# Patient Record
Sex: FEMALE | URBAN - METROPOLITAN AREA
[De-identification: names, ages, dates, MRNs, and addresses within clinical notes are randomized per-mention and may not be internally consistent; named-entity substitution may affect disease eponyms.]

---

## 2023-08-15 ENCOUNTER — WEB ENCOUNTER (OUTPATIENT)
Dept: URBAN - METROPOLITAN AREA CLINIC 96 | Facility: CLINIC | Age: 58
End: 2023-08-15

## 2023-08-21 ENCOUNTER — OFFICE VISIT (OUTPATIENT)
Dept: URBAN - METROPOLITAN AREA CLINIC 96 | Facility: CLINIC | Age: 58
End: 2023-08-21
Payer: OTHER GOVERNMENT

## 2023-08-21 ENCOUNTER — DASHBOARD ENCOUNTERS (OUTPATIENT)
Age: 58
End: 2023-08-21

## 2023-08-21 ENCOUNTER — LAB OUTSIDE AN ENCOUNTER (OUTPATIENT)
Dept: URBAN - METROPOLITAN AREA CLINIC 96 | Facility: CLINIC | Age: 58
End: 2023-08-21

## 2023-08-21 VITALS
HEART RATE: 84 BPM | SYSTOLIC BLOOD PRESSURE: 143 MMHG | HEIGHT: 72 IN | WEIGHT: 202 LBS | DIASTOLIC BLOOD PRESSURE: 90 MMHG | BODY MASS INDEX: 27.36 KG/M2 | TEMPERATURE: 97.6 F

## 2023-08-21 DIAGNOSIS — K64.9 ACUTE HEMORRHOID: ICD-10-CM

## 2023-08-21 DIAGNOSIS — Z86.010 PERSONAL HISTORY OF COLONIC POLYPS: ICD-10-CM

## 2023-08-21 PROBLEM — 428283002: Status: ACTIVE | Noted: 2023-08-21

## 2023-08-21 PROCEDURE — 99204 OFFICE O/P NEW MOD 45 MIN: CPT | Performed by: PHYSICIAN ASSISTANT

## 2023-08-21 RX ORDER — POLYETHYLENE GLYCOL 3350, SODIUM SULFATE, SODIUM CHLORIDE, POTASSIUM CHLORIDE, ASCORBIC ACID, SODIUM ASCORBATE 140-9-5.2G
AS DIRECTED KIT ORAL AS DIRECTED
Qty: 1 | Refills: 0 | OUTPATIENT
Start: 2023-08-21 | End: 2023-08-22

## 2023-08-21 RX ORDER — HYDROCORTISONE 25 MG/G
1 APPLICATION CREAM TOPICAL TWICE A DAY
Qty: 60 | Refills: 5 | OUTPATIENT
Start: 2023-08-21 | End: 2024-02-16

## 2023-08-21 RX ORDER — SEMAGLUTIDE 1 MG/.5ML
0.5 ML INJECTION, SOLUTION SUBCUTANEOUS
Status: ACTIVE | COMMUNITY

## 2023-08-21 RX ORDER — ASPIRIN 81 MG/1
TABLET, COATED ORAL
Qty: 0 | Refills: 0 | Status: ACTIVE | COMMUNITY
Start: 1900-01-01

## 2023-08-21 NOTE — HPI-TODAY'S VISIT:
Pt had her last colon 8/2019 -- tubular adenoma pt c/o external hemorrhoid - has had since 2019 - can be painful at times- bleeds at times no abd pain no nausea or emesis normal bowel habit is 1 BM a day no melena has not tried any meds or treatments for the hemorrhoids - no sitz baths

## 2023-08-21 NOTE — PHYSICAL EXAM GASTROINTESTINAL
Abdomen, soft, nontender, nondistended, no guarding or rigidity, no masses palpable, normal bowel sounds, Liver and Spleen, no hepatomegaly present, no hepatosplenomegaly, liver nontenderRectal, large external hemorrhoids, small fissure, skin tags, internal hemorrhoids

## 2023-10-11 ENCOUNTER — OUT OF OFFICE VISIT (OUTPATIENT)
Dept: URBAN - METROPOLITAN AREA SURGERY CENTER 18 | Facility: SURGERY CENTER | Age: 58
End: 2023-10-11
Payer: OTHER GOVERNMENT

## 2023-10-11 DIAGNOSIS — Z12.11 COLON CANCER SCREENING (HIGH RISK): ICD-10-CM

## 2023-10-11 DIAGNOSIS — Z86.010 PERSONAL HISTORY OF COLON POLYPS: ICD-10-CM

## 2023-10-11 DIAGNOSIS — K64.8 HEMORRHOIDS, INTERNAL. NON-BLEEDING: ICD-10-CM

## 2023-10-11 DIAGNOSIS — Z86.010 ADENOMAS PERSONAL HISTORY OF COLONIC POLYPS: ICD-10-CM

## 2023-10-11 PROCEDURE — G8907 PT DOC NO EVENTS ON DISCHARG: HCPCS | Performed by: INTERNAL MEDICINE

## 2023-10-11 PROCEDURE — G0105 COLORECTAL SCRN; HI RISK IND: HCPCS | Performed by: INTERNAL MEDICINE

## 2023-10-11 PROCEDURE — 00811 ANES LWR INTST NDSC NOS: CPT | Performed by: NURSE ANESTHETIST, CERTIFIED REGISTERED
